# Patient Record
Sex: MALE | Race: WHITE | NOT HISPANIC OR LATINO | URBAN - METROPOLITAN AREA
[De-identification: names, ages, dates, MRNs, and addresses within clinical notes are randomized per-mention and may not be internally consistent; named-entity substitution may affect disease eponyms.]

---

## 2017-01-19 ENCOUNTER — OUTPATIENT (OUTPATIENT)
Dept: OUTPATIENT SERVICES | Facility: HOSPITAL | Age: 57
LOS: 1 days | End: 2017-01-19
Payer: COMMERCIAL

## 2017-01-19 DIAGNOSIS — K40.90 UNILATERAL INGUINAL HERNIA, WITHOUT OBSTRUCTION OR GANGRENE, NOT SPECIFIED AS RECURRENT: ICD-10-CM

## 2017-01-19 DIAGNOSIS — Z01.812 ENCOUNTER FOR PREPROCEDURAL LABORATORY EXAMINATION: ICD-10-CM

## 2017-01-19 DIAGNOSIS — Z01.818 ENCOUNTER FOR OTHER PREPROCEDURAL EXAMINATION: ICD-10-CM

## 2017-01-19 LAB
ANION GAP SERPL CALC-SCNC: 16 MMOL/L — SIGNIFICANT CHANGE UP (ref 5–17)
BUN SERPL-MCNC: 19 MG/DL — SIGNIFICANT CHANGE UP (ref 7–23)
CALCIUM SERPL-MCNC: 10 MG/DL — SIGNIFICANT CHANGE UP (ref 8.4–10.5)
CHLORIDE SERPL-SCNC: 101 MMOL/L — SIGNIFICANT CHANGE UP (ref 96–108)
CO2 SERPL-SCNC: 23 MMOL/L — SIGNIFICANT CHANGE UP (ref 22–31)
CREAT SERPL-MCNC: 0.96 MG/DL — SIGNIFICANT CHANGE UP (ref 0.5–1.3)
GLUCOSE SERPL-MCNC: 85 MG/DL — SIGNIFICANT CHANGE UP (ref 70–99)
HCT VFR BLD CALC: 47.3 % — SIGNIFICANT CHANGE UP (ref 39–50)
HGB BLD-MCNC: 16.1 G/DL — SIGNIFICANT CHANGE UP (ref 13–17)
MCHC RBC-ENTMCNC: 29.4 PG — SIGNIFICANT CHANGE UP (ref 27–34)
MCHC RBC-ENTMCNC: 34 GM/DL — SIGNIFICANT CHANGE UP (ref 32–36)
MCV RBC AUTO: 86.3 FL — SIGNIFICANT CHANGE UP (ref 80–100)
PLATELET # BLD AUTO: 295 K/UL — SIGNIFICANT CHANGE UP (ref 150–400)
POTASSIUM SERPL-MCNC: 4.8 MMOL/L — SIGNIFICANT CHANGE UP (ref 3.5–5.3)
POTASSIUM SERPL-SCNC: 4.8 MMOL/L — SIGNIFICANT CHANGE UP (ref 3.5–5.3)
RBC # BLD: 5.48 M/UL — SIGNIFICANT CHANGE UP (ref 4.2–5.8)
RBC # FLD: 12.7 % — SIGNIFICANT CHANGE UP (ref 10.3–14.5)
SODIUM SERPL-SCNC: 140 MMOL/L — SIGNIFICANT CHANGE UP (ref 135–145)
WBC # BLD: 6.55 K/UL — SIGNIFICANT CHANGE UP (ref 3.8–10.5)
WBC # FLD AUTO: 6.55 K/UL — SIGNIFICANT CHANGE UP (ref 3.8–10.5)

## 2017-01-19 PROCEDURE — G0463: CPT

## 2017-01-19 PROCEDURE — 85027 COMPLETE CBC AUTOMATED: CPT

## 2017-01-19 PROCEDURE — 80048 BASIC METABOLIC PNL TOTAL CA: CPT

## 2017-01-19 PROCEDURE — 36415 COLL VENOUS BLD VENIPUNCTURE: CPT

## 2017-01-30 ENCOUNTER — RESULT REVIEW (OUTPATIENT)
Age: 57
End: 2017-01-30

## 2017-01-31 ENCOUNTER — OUTPATIENT (OUTPATIENT)
Dept: OUTPATIENT SERVICES | Facility: HOSPITAL | Age: 57
LOS: 1 days | End: 2017-01-31
Payer: COMMERCIAL

## 2017-01-31 ENCOUNTER — APPOINTMENT (OUTPATIENT)
Dept: SURGERY | Facility: HOSPITAL | Age: 57
End: 2017-01-31

## 2017-01-31 DIAGNOSIS — K40.90 UNILATERAL INGUINAL HERNIA, WITHOUT OBSTRUCTION OR GANGRENE, NOT SPECIFIED AS RECURRENT: ICD-10-CM

## 2017-01-31 PROCEDURE — 49505 PRP I/HERN INIT REDUC >5 YR: CPT | Mod: LT

## 2017-01-31 PROCEDURE — 88304 TISSUE EXAM BY PATHOLOGIST: CPT

## 2017-01-31 PROCEDURE — 88304 TISSUE EXAM BY PATHOLOGIST: CPT | Mod: 26

## 2017-01-31 PROCEDURE — C1781: CPT

## 2017-02-06 LAB — SURGICAL PATHOLOGY STUDY: SIGNIFICANT CHANGE UP

## 2017-02-17 ENCOUNTER — APPOINTMENT (OUTPATIENT)
Dept: SURGERY | Facility: CLINIC | Age: 57
End: 2017-02-17

## 2017-02-17 VITALS
DIASTOLIC BLOOD PRESSURE: 80 MMHG | HEART RATE: 65 BPM | TEMPERATURE: 98.1 F | SYSTOLIC BLOOD PRESSURE: 143 MMHG | OXYGEN SATURATION: 98 % | RESPIRATION RATE: 16 BRPM

## 2017-02-17 DIAGNOSIS — K40.90 UNILATERAL INGUINAL HERNIA, W/OUT OBSTRUCTION OR GANGRENE, NOT SPECIFIED AS RECURRENT: ICD-10-CM

## 2017-02-17 RX ORDER — OXYCODONE AND ACETAMINOPHEN 5; 325 MG/1; MG/1
5-325 TABLET ORAL
Qty: 10 | Refills: 0 | Status: DISCONTINUED | COMMUNITY
Start: 2017-01-31 | End: 2017-02-17

## 2017-09-15 ENCOUNTER — APPOINTMENT (OUTPATIENT)
Dept: INTERNAL MEDICINE | Facility: CLINIC | Age: 57
End: 2017-09-15
Payer: COMMERCIAL

## 2017-09-15 ENCOUNTER — APPOINTMENT (OUTPATIENT)
Dept: INTERNAL MEDICINE | Facility: CLINIC | Age: 57
End: 2017-09-15

## 2017-09-15 DIAGNOSIS — Z23 ENCOUNTER FOR IMMUNIZATION: ICD-10-CM

## 2017-09-15 PROCEDURE — 90471 IMMUNIZATION ADMIN: CPT

## 2017-09-15 PROCEDURE — 90707 MMR VACCINE SC: CPT

## 2017-09-15 PROCEDURE — 99201 OFFICE OUTPATIENT NEW 10 MINUTES: CPT | Mod: 25

## 2020-04-29 ENCOUNTER — TRANSCRIPTION ENCOUNTER (OUTPATIENT)
Age: 60
End: 2020-04-29

## 2020-09-05 DIAGNOSIS — Z01.818 ENCOUNTER FOR OTHER PREPROCEDURAL EXAMINATION: ICD-10-CM

## 2020-09-06 ENCOUNTER — APPOINTMENT (OUTPATIENT)
Dept: DISASTER EMERGENCY | Facility: CLINIC | Age: 60
End: 2020-09-06

## 2020-09-06 LAB — SARS-COV-2 N GENE NPH QL NAA+PROBE: NOT DETECTED

## 2020-09-08 ENCOUNTER — TRANSCRIPTION ENCOUNTER (OUTPATIENT)
Age: 60
End: 2020-09-08

## 2020-09-09 ENCOUNTER — RESULT REVIEW (OUTPATIENT)
Age: 60
End: 2020-09-09

## 2020-09-09 ENCOUNTER — OUTPATIENT (OUTPATIENT)
Dept: OUTPATIENT SERVICES | Facility: HOSPITAL | Age: 60
LOS: 1 days | End: 2020-09-09
Payer: COMMERCIAL

## 2020-09-09 DIAGNOSIS — K57.30 DIVERTICULOSIS OF LARGE INTESTINE WITHOUT PERFORATION OR ABSCESS WITHOUT BLEEDING: ICD-10-CM

## 2020-09-09 DIAGNOSIS — K21.9 GASTRO-ESOPHAGEAL REFLUX DISEASE WITHOUT ESOPHAGITIS: ICD-10-CM

## 2020-09-09 DIAGNOSIS — Z12.89 ENCOUNTER FOR SCREENING FOR MALIGNANT NEOPLASM OF OTHER SITES: ICD-10-CM

## 2020-09-09 PROCEDURE — 88312 SPECIAL STAINS GROUP 1: CPT | Mod: 26

## 2020-09-09 PROCEDURE — 88313 SPECIAL STAINS GROUP 2: CPT | Mod: 26

## 2020-09-09 PROCEDURE — 88305 TISSUE EXAM BY PATHOLOGIST: CPT

## 2020-09-09 PROCEDURE — 88312 SPECIAL STAINS GROUP 1: CPT

## 2020-09-09 PROCEDURE — 88305 TISSUE EXAM BY PATHOLOGIST: CPT | Mod: 26

## 2020-09-09 PROCEDURE — 88313 SPECIAL STAINS GROUP 2: CPT

## 2020-09-09 PROCEDURE — 43239 EGD BIOPSY SINGLE/MULTIPLE: CPT

## 2020-09-09 PROCEDURE — 45380 COLONOSCOPY AND BIOPSY: CPT | Mod: PT

## 2020-09-10 LAB — SURGICAL PATHOLOGY STUDY: SIGNIFICANT CHANGE UP

## 2022-04-13 ENCOUNTER — EMERGENCY (EMERGENCY)
Facility: HOSPITAL | Age: 62
LOS: 1 days | Discharge: HOME | End: 2022-04-13
Attending: EMERGENCY MEDICINE | Admitting: EMERGENCY MEDICINE
Payer: COMMERCIAL

## 2022-04-13 VITALS
TEMPERATURE: 98 F | HEIGHT: 71 IN | OXYGEN SATURATION: 97 % | SYSTOLIC BLOOD PRESSURE: 187 MMHG | HEART RATE: 94 BPM | RESPIRATION RATE: 16 BRPM | WEIGHT: 230.38 LBS | DIASTOLIC BLOOD PRESSURE: 107 MMHG

## 2022-04-13 VITALS
SYSTOLIC BLOOD PRESSURE: 161 MMHG | DIASTOLIC BLOOD PRESSURE: 95 MMHG | RESPIRATION RATE: 15 BRPM | HEART RATE: 79 BPM | OXYGEN SATURATION: 95 %

## 2022-04-13 LAB
ALBUMIN SERPL ELPH-MCNC: 4 G/DL — SIGNIFICANT CHANGE UP (ref 3.3–5)
ALP SERPL-CCNC: 51 U/L — SIGNIFICANT CHANGE UP (ref 30–120)
ALT FLD-CCNC: 32 U/L DA — SIGNIFICANT CHANGE UP (ref 10–60)
ANION GAP SERPL CALC-SCNC: 7 MMOL/L — SIGNIFICANT CHANGE UP (ref 5–17)
AST SERPL-CCNC: 19 U/L — SIGNIFICANT CHANGE UP (ref 10–40)
BASOPHILS # BLD AUTO: 0.04 K/UL — SIGNIFICANT CHANGE UP (ref 0–0.2)
BASOPHILS NFR BLD AUTO: 0.8 % — SIGNIFICANT CHANGE UP (ref 0–2)
BILIRUB SERPL-MCNC: 0.5 MG/DL — SIGNIFICANT CHANGE UP (ref 0.2–1.2)
BUN SERPL-MCNC: 20 MG/DL — SIGNIFICANT CHANGE UP (ref 7–23)
CALCIUM SERPL-MCNC: 9.1 MG/DL — SIGNIFICANT CHANGE UP (ref 8.4–10.5)
CHLORIDE SERPL-SCNC: 103 MMOL/L — SIGNIFICANT CHANGE UP (ref 96–108)
CO2 SERPL-SCNC: 25 MMOL/L — SIGNIFICANT CHANGE UP (ref 22–31)
CREAT SERPL-MCNC: 0.89 MG/DL — SIGNIFICANT CHANGE UP (ref 0.5–1.3)
EGFR: 98 ML/MIN/1.73M2 — SIGNIFICANT CHANGE UP
EOSINOPHIL # BLD AUTO: 0.12 K/UL — SIGNIFICANT CHANGE UP (ref 0–0.5)
EOSINOPHIL NFR BLD AUTO: 2.5 % — SIGNIFICANT CHANGE UP (ref 0–6)
GLUCOSE SERPL-MCNC: 101 MG/DL — HIGH (ref 70–99)
HCT VFR BLD CALC: 45.7 % — SIGNIFICANT CHANGE UP (ref 39–50)
HGB BLD-MCNC: 15.9 G/DL — SIGNIFICANT CHANGE UP (ref 13–17)
IMM GRANULOCYTES NFR BLD AUTO: 0 % — SIGNIFICANT CHANGE UP (ref 0–1.5)
LYMPHOCYTES # BLD AUTO: 0.88 K/UL — LOW (ref 1–3.3)
LYMPHOCYTES # BLD AUTO: 18.4 % — SIGNIFICANT CHANGE UP (ref 13–44)
MCHC RBC-ENTMCNC: 29.8 PG — SIGNIFICANT CHANGE UP (ref 27–34)
MCHC RBC-ENTMCNC: 34.8 GM/DL — SIGNIFICANT CHANGE UP (ref 32–36)
MCV RBC AUTO: 85.7 FL — SIGNIFICANT CHANGE UP (ref 80–100)
MONOCYTES # BLD AUTO: 0.28 K/UL — SIGNIFICANT CHANGE UP (ref 0–0.9)
MONOCYTES NFR BLD AUTO: 5.9 % — SIGNIFICANT CHANGE UP (ref 2–14)
NEUTROPHILS # BLD AUTO: 3.46 K/UL — SIGNIFICANT CHANGE UP (ref 1.8–7.4)
NEUTROPHILS NFR BLD AUTO: 72.4 % — SIGNIFICANT CHANGE UP (ref 43–77)
NRBC # BLD: 0 /100 WBCS — SIGNIFICANT CHANGE UP (ref 0–0)
PLATELET # BLD AUTO: 229 K/UL — SIGNIFICANT CHANGE UP (ref 150–400)
POTASSIUM SERPL-MCNC: 4.2 MMOL/L — SIGNIFICANT CHANGE UP (ref 3.5–5.3)
POTASSIUM SERPL-SCNC: 4.2 MMOL/L — SIGNIFICANT CHANGE UP (ref 3.5–5.3)
PROT SERPL-MCNC: 7.3 G/DL — SIGNIFICANT CHANGE UP (ref 6–8.3)
RBC # BLD: 5.33 M/UL — SIGNIFICANT CHANGE UP (ref 4.2–5.8)
RBC # FLD: 12.2 % — SIGNIFICANT CHANGE UP (ref 10.3–14.5)
SODIUM SERPL-SCNC: 135 MMOL/L — SIGNIFICANT CHANGE UP (ref 135–145)
WBC # BLD: 4.78 K/UL — SIGNIFICANT CHANGE UP (ref 3.8–10.5)
WBC # FLD AUTO: 4.78 K/UL — SIGNIFICANT CHANGE UP (ref 3.8–10.5)

## 2022-04-13 PROCEDURE — 99283 EMERGENCY DEPT VISIT LOW MDM: CPT

## 2022-04-13 PROCEDURE — 80053 COMPREHEN METABOLIC PANEL: CPT

## 2022-04-13 PROCEDURE — 85025 COMPLETE CBC W/AUTO DIFF WBC: CPT

## 2022-04-13 PROCEDURE — 99284 EMERGENCY DEPT VISIT MOD MDM: CPT

## 2022-04-13 PROCEDURE — 36415 COLL VENOUS BLD VENIPUNCTURE: CPT

## 2022-04-13 PROCEDURE — 93005 ELECTROCARDIOGRAM TRACING: CPT

## 2022-04-13 PROCEDURE — 93010 ELECTROCARDIOGRAM REPORT: CPT

## 2022-04-13 NOTE — ED PROVIDER NOTE - OBJECTIVE STATEMENT
60 yo M p/w co epistaxis today. pt with hx on / off epistaxis, usually around change in season. however pt with persistent epistaxis today over past ~ 1.5 hours. No fever/chills. no fall / trauma. Pt took BP at home and states was 180 / 120. Pt is not on meds for HTN, has had elevated BP in past at home. no cp/sob/palp. no cough/ uri. no neck / back pain. no weakness/ dizziness. no fever/chills. No YEH / easy fatigue. No Headaches. no other acute co or changes. Pt vaccinated for covid, no exposures.

## 2022-04-13 NOTE — ED PROVIDER NOTE - CLINICAL SUMMARY MEDICAL DECISION MAKING FREE TEXT BOX
Epistaxis today, persistent - no anticoag. nasal packing, outpt ENT  HTN in ed - no hx. check labs, po meds

## 2022-04-13 NOTE — ED PROVIDER NOTE - NSFOLLOWUPINSTRUCTIONS_ED_ALL_ED_FT
1)  Follow-up with your Primary Medical Doctor. Call today for prompt follow-up.  2) Follow-up with ENT - call today to arrange prompt follow-up  3) Return to Emergency room for any worsening or persistent pain, shortness of breath, weakness, fever, abdominal pain, dizziness, passing out, unexplained pain in arms, legs, back, any vomiting, feeling like your heart is racing,  or any other concerning symptoms.  4) See attached instruction sheets for additional information, including information regarding signs and symptoms to look out for, reasons to seek immediate care and other important instructions.   5) Augmentin twice daily while packing in place  6) Vasotec 10mg once daily

## 2022-04-13 NOTE — ED ADULT NURSE NOTE - NSIMPLEMENTINTERV_GEN_ALL_ED
Implemented All Fall with Harm Risk Interventions:  [place] to call system. Call bell, personal items and telephone within reach. Instruct patient to call for assistance. Room bathroom lighting operational. Non-slip footwear when patient is off stretcher. Physically safe environment: no spills, clutter or unnecessary equipment. Stretcher in lowest position, wheels locked, appropriate side rails in place. Provide visual cue, wrist band, yellow gown, etc. Monitor gait and stability. Monitor for mental status changes and reorient to person, place, and time. Review medications for side effects contributing to fall risk. Reinforce activity limits and safety measures with patient and family. Provide visual clues: red socks.

## 2022-04-13 NOTE — ED PROVIDER NOTE - PATIENT PORTAL LINK FT
You can access the FollowMyHealth Patient Portal offered by [facility] by registering at the following website: [email or web address]. By joining [employer]’s FollowMyHealth portal, you will also be able to view your health information using other applications (apps) compatible with our system.

## 2022-04-13 NOTE — ED PROVIDER NOTE - CHPI ED SYMPTOMS NEG
no blurred vision/no fever/no nausea/no numbness/no vomiting/no weakness/no change in level of consciousness/no chills

## 2022-04-13 NOTE — ED PROVIDER NOTE - ENMT, MLM
Airway patent, Nasal mucosa clear. Mouth with normal mucosa. Throat has no vesicles, no oropharyngeal exudates and uvula is midline. persistent oozing R nare, no edema. Nl L nare.

## 2022-04-13 NOTE — ED ADULT NURSE NOTE - OBJECTIVE STATEMENT
62 y/o male received aox4 ambulatory c/o sudden nose bleeding today from right nostril. denies trauma/n/v/d/headache.

## 2022-04-13 NOTE — ED PROVIDER NOTE - PROGRESS NOTE DETAILS
Dw Dr Raymundo - agree with rx plan, should start Vasotec 10mg daily, will fu with pt in office. Dw Pt - feeling well, no acute co or changes. Pt with no bleeding, nl post pharynx. Dw pt re epistaxis  prec/ inst, infxn prec as well. Dw pt re HTN prec / inst and importance of close, prompt fu.  Pt will fu with PMD and with ent for further ridley and eval.

## 2022-04-13 NOTE — ED PROVIDER NOTE - CARE PROVIDER_API CALL
West Raymundo (DO)  Internal Medicine  [address] [place] [address]  Phone: [phone]  Fax: [phone]  Follow Up Time:     Franck Butler (MD)  Otolaryngology  [address], [address]  [place] [address]  Phone: [phone]  Fax: [phone]  Follow Up Time:

## 2022-04-14 ENCOUNTER — EMERGENCY (EMERGENCY)
Facility: HOSPITAL | Age: 62
LOS: 1 days | Discharge: HOME | End: 2022-04-14
Attending: INTERNAL MEDICINE | Admitting: INTERNAL MEDICINE
Payer: COMMERCIAL

## 2022-04-14 VITALS
RESPIRATION RATE: 20 BRPM | HEIGHT: 71 IN | HEART RATE: 84 BPM | TEMPERATURE: 98 F | OXYGEN SATURATION: 99 % | DIASTOLIC BLOOD PRESSURE: 112 MMHG | SYSTOLIC BLOOD PRESSURE: 156 MMHG | WEIGHT: 229.28 LBS

## 2022-04-14 VITALS
OXYGEN SATURATION: 99 % | SYSTOLIC BLOOD PRESSURE: 132 MMHG | DIASTOLIC BLOOD PRESSURE: 95 MMHG | RESPIRATION RATE: 20 BRPM | TEMPERATURE: 98 F | HEART RATE: 84 BPM

## 2022-04-14 PROCEDURE — 99283 EMERGENCY DEPT VISIT LOW MDM: CPT

## 2022-04-14 PROCEDURE — 99284 EMERGENCY DEPT VISIT MOD MDM: CPT

## 2022-04-14 RX ADMIN — Medication 0.2 MILLIGRAM(S): at 21:52

## 2022-04-14 NOTE — ED ADULT NURSE NOTE - OBJECTIVE STATEMENT
60 yo M p/w co epistaxis today. pt with hx on / off epistaxis, usually around change in season. however pt with persistent epistaxis today over past ~ 1.5 hours. No fever/chills. no fall / trauma. Pt took BP at home and states was 180 / 120. Pt is not on meds for HTN, has had elevated BP in past at home. no cp/sob/palp. no cough/ uri. no neck / back pain. no weakness/ dizziness. no fever/chills. No YEH / easy fatigue. No Headaches. no other acute co or changes. Pt vaccinated for covid, no exposures.  ·

## 2022-04-14 NOTE — ED PROVIDER NOTE - PATIENT PORTAL LINK FT
You can access the FollowMyHealth Patient Portal offered by [facility] by registering at the following website: [email or web address]. By joining [email or web address]’s FollowMyHealth portal, you will also be able to view your health information using other applications (apps) compatible with our system.

## 2022-04-14 NOTE — ED PROVIDER NOTE - OBJECTIVE STATEMENT
60 yo M p/w co epistaxis today. pt with hx on / off epistaxis, usually around change in season. however pt with persistent epistaxis today over past ~ 1.5 hours. No fever/chills. no fall / trauma. Pt took BP at home and states was 180 / 120. Pt is not on meds for HTN, has had elevated BP in past at home. no cp/sob/palp. no cough/ uri. no neck / back pain. no weakness/ dizziness. no fever/chills. No YEH / easy fatigue. No Headaches. no other acute co or changes. Pt vaccinated for covid, no exposures.  · Negative Findings: no blurred vision, no change in level of consciousness, no chills, no fever, no nausea, no numbness, no vomiting, no weakness 62 y/o male who was treated for HTN and  epistaxis yesterday. His BP was  180 / 120. He received a rhino plug that effectively controlled the bleeding and was started on Vasotec for the BP. the following day his BP was elevated again but presumed to be due to the discomfort from the plug. Dr Raymundo told him to double the Vasotec, he then came to the ED requesting removal of the plug, there was no active bleeding, his bp was treated with catapres .2mg and improved the BP, the plug was removed, no bleeding and the patient was notably comfortably

## 2022-04-14 NOTE — ED PROVIDER NOTE - CARE PROVIDER_API CALL
West Raymundo (DO)  Internal Medicine  [address] [place] [address]  Phone: [phone]  Fax: [phone]  Follow Up Time: 1-3 Days

## 2022-04-14 NOTE — ED ADULT NURSE NOTE - CHPI ED NUR RELIEVING FX
Referral has been faxed to [facility] heart and vascular for review of patient's marfan's Syndrome    none

## 2022-04-14 NOTE — ED ADULT NURSE NOTE - NSFALLRSKOUTCOME_ED_ALL_ED
Patient informed of lab results, creatinine better then previous lab of 2.9. 2.5 yesterday. Patient verbalized understanding. Lizzie Cameron LPN      ----- Message from ERIC Jordan sent at 4/20/2020  9:01 PM EDT -----  Regarding: FW:  Creatinine is down from 2.9 to 2.5  ----- Message -----  From: Lab, Background User  Sent: 4/20/2020   6:32 PM EDT  To: ERIC Jordan      
Universal Safety Interventions

## 2022-04-14 NOTE — ED PROVIDER NOTE - CLINICAL SUMMARY MEDICAL DECISION MAKING FREE TEXT BOX
elevated BP treated with catapres, removal of plug , no bleeding and the patient was notably comfortable  Discharged in stable condition with ENT F/U and Dr Raymundo

## 2022-04-14 NOTE — ED ADULT NURSE NOTE - NSICDXPASTMEDICALHX_GEN_ALL_CORE_FT
PAST MEDICAL HISTORY:  H/O epistaxis     Hypertension      No signs of meconium exposure, Normal patterns of skin texture, integrity, pigmentation, color, vascularity, and perfusion; No rashes or eruptions.

## 2022-04-18 ENCOUNTER — APPOINTMENT (OUTPATIENT)
Dept: OTOLARYNGOLOGY | Facility: CLINIC | Age: 62
End: 2022-04-18
Payer: COMMERCIAL

## 2022-04-18 VITALS
HEART RATE: 92 BPM | BODY MASS INDEX: 31.5 KG/M2 | SYSTOLIC BLOOD PRESSURE: 148 MMHG | WEIGHT: 225 LBS | HEIGHT: 71 IN | DIASTOLIC BLOOD PRESSURE: 90 MMHG

## 2022-04-18 DIAGNOSIS — I10 ESSENTIAL (PRIMARY) HYPERTENSION: ICD-10-CM

## 2022-04-18 DIAGNOSIS — H61.23 IMPACTED CERUMEN, BILATERAL: ICD-10-CM

## 2022-04-18 DIAGNOSIS — Z86.79 PERSONAL HISTORY OF OTHER DISEASES OF THE CIRCULATORY SYSTEM: ICD-10-CM

## 2022-04-18 PROBLEM — Z87.898 PERSONAL HISTORY OF OTHER SPECIFIED CONDITIONS: Chronic | Status: ACTIVE | Noted: 2022-04-14

## 2022-04-18 PROCEDURE — 69210 REMOVE IMPACTED EAR WAX UNI: CPT

## 2022-04-18 PROCEDURE — 99243 OFF/OP CNSLTJ NEW/EST LOW 30: CPT | Mod: 25

## 2022-04-18 PROCEDURE — 30901 CONTROL OF NOSEBLEED: CPT | Mod: RT

## 2022-04-18 RX ORDER — ENALAPRIL MALEATE 5 MG/1
TABLET ORAL
Refills: 0 | Status: ACTIVE | COMMUNITY

## 2022-04-18 RX ORDER — MUPIROCIN 20 MG/G
2 OINTMENT TOPICAL 3 TIMES DAILY
Qty: 1 | Refills: 3 | Status: ACTIVE | COMMUNITY
Start: 2022-04-18 | End: 1900-01-01

## 2022-04-18 NOTE — REASON FOR VISIT
[Initial Consultation] : an initial consultation for [FreeTextEntry2] : Nose bleeds/Nose packed 4/13

## 2022-04-18 NOTE — ASSESSMENT
[FreeTextEntry1] : Reviewed and reconciled medications, allergies, PMHx, PSHx, SocHx, FMHx. \par \par Plan:\par cerumen removed. Right Nasal Cauterization. if starts to bleed again put pressure on the nose and hold for 5 mins. sneeze with mouth open. no nose blowing. mupirocin prescribed. no saline spray since blood pressure is too high. FU 10-14 days

## 2022-04-18 NOTE — HISTORY OF PRESENT ILLNESS
[de-identified] : Patient presents stating that he gets nose bleeds seasonally. The last nose bleed was Wednesday on the right side when he was outside walking is dog. He states this one lastest longer than normal and he was not able to stop the bleeding. Patient states his BP was 187/70 that day, he went to [facility] where they put in a rhino rocket. Patient went back to [facility] a couple days later due to the pressure where they took the rocket out and the bleeding stopped. Patient states BP came down once the rhino rocket came out.

## 2022-04-18 NOTE — ADDENDUM
[FreeTextEntry1] : Documented by Sarah Lee acting as scribe for Dr. Butler on 04/18/2022. \par \par All Medical record entries made by the scribe were at my. Dr. Butler direction and personally dictated by me on 04/18/2022. I have reviewed the chart and agree that the record accurately reflects my personal performance of the history, physical exam, assessment and plan. I have also personally directed, reviewed, and agreed with the discharge instructions.

## 2022-04-18 NOTE — PROCEDURE
[FreeTextEntry2] : epistaxis  [FreeTextEntry1] : Right Nasal Cauterization [FreeTextEntry3] : Procedure: Right Nasal Cauterization. After topical 4% xylocaine and oxymetazoline, the nasal vault was re-evaluated. Bleeding site identified. Cauterized with silver nitrate. Post-procedure instructions given. Patient tolerated procedure well

## 2022-04-18 NOTE — PHYSICAL EXAM
[Hearing Scales Test (Tuning Fork On Forehead)] : no lateralization of tone [Midline] : trachea located in midline position [Normal] : no rashes [FreeTextEntry8] : cerumen impaction removed with curettage [FreeTextEntry9] : cerumen impaction removed with curettage [FreeTextEntry1] : deviated septum left and dryness\par right side hypervascular with small hemangioma \par deviated septum right anteriorly  [de-identified] : elongated uvula [de-identified] : class 1 [de-identified] : CLARISSE

## 2022-04-18 NOTE — CONSULT LETTER
[Dear  ___] : Dear  [unfilled], [Consult Letter:] : I had the pleasure of evaluating your patient, [unfilled]. [Please see my note below.] : Please see my note below. [Consult Closing:] : Thank you very much for allowing me to participate in the care of this patient.  If you have any questions, please do not hesitate to contact me. [Sincerely,] : Sincerely, [FreeTextEntry3] : Fracnk Butler MD FACS

## 2022-05-02 ENCOUNTER — APPOINTMENT (OUTPATIENT)
Dept: OTOLARYNGOLOGY | Facility: CLINIC | Age: 62
End: 2022-05-02
Payer: COMMERCIAL

## 2022-05-02 VITALS
HEART RATE: 88 BPM | BODY MASS INDEX: 31.5 KG/M2 | SYSTOLIC BLOOD PRESSURE: 123 MMHG | HEIGHT: 71 IN | DIASTOLIC BLOOD PRESSURE: 87 MMHG | WEIGHT: 225 LBS

## 2022-05-02 DIAGNOSIS — Z82.41 FAMILY HISTORY OF SUDDEN CARDIAC DEATH: ICD-10-CM

## 2022-05-02 DIAGNOSIS — R04.0 EPISTAXIS: ICD-10-CM

## 2022-05-02 PROCEDURE — 30901 CONTROL OF NOSEBLEED: CPT

## 2022-05-02 PROCEDURE — 99213 OFFICE O/P EST LOW 20 MIN: CPT | Mod: 25

## 2022-05-02 NOTE — ASSESSMENT
[FreeTextEntry1] : Reviewed and reconciled medications, allergies, PMHx, PSHx, SocHx, FMHx. \par \par h/o recurrent episodes of nosebleeds. Small hemangioma right side which was cauterized 4/18/22 in office\par \par Plan:\par No nose blowing. Right Nasal Cauterization. use saline gel spray 2-3 times a day. use mupirocin. FU 2-3 weeks

## 2022-05-02 NOTE — PHYSICAL EXAM
[FreeTextEntry1] : Some scabbing on the right side - no bleeding\par Left side clear\par bleeding site is anterior superior  [Midline] : trachea located in midline position [Normal] : orientation to person, place, and time: normal

## 2022-05-02 NOTE — HISTORY OF PRESENT ILLNESS
[de-identified] : The patient presents with h/o recurrent episodes of nosebleeds. Small hemangioma right side which was cauterized 4/18/22 in office. Pt reports having 2 nosebleed episodes a few days ago when in the shower. Pt reports using mupirocin.

## 2022-05-02 NOTE — ADDENDUM
[FreeTextEntry1] : Documented by Dafne Sharma acting as scribe for Dr. Butler on 05/02/2022. \par \par All Medical record entries made by the scribe were at my. Dr. Butler direction and personally dictated by me on 05/02/2022. I have reviewed the chart and agree that the record accurately reflects my personal performance of the history, physical exam, assessment and plan. I have also personally directed, reviewed, and agreed with the discharge instructions.

## 2022-05-02 NOTE — CONSULT LETTER
[Dear  ___] : Dear  [unfilled], [Courtesy Letter:] : I had the pleasure of seeing your patient, [unfilled], in my office today. [Please see my note below.] : Please see my note below. [Consult Closing:] : Thank you very much for allowing me to participate in the care of this patient.  If you have any questions, please do not hesitate to contact me. [Sincerely,] : Sincerely, [FreeTextEntry3] : Franck Butler MD FACS

## 2022-05-23 ENCOUNTER — APPOINTMENT (OUTPATIENT)
Dept: OTOLARYNGOLOGY | Facility: CLINIC | Age: 62
End: 2022-05-23
Payer: COMMERCIAL

## 2022-05-23 VITALS
SYSTOLIC BLOOD PRESSURE: 128 MMHG | DIASTOLIC BLOOD PRESSURE: 85 MMHG | BODY MASS INDEX: 30.8 KG/M2 | WEIGHT: 220 LBS | HEIGHT: 71 IN | HEART RATE: 71 BPM

## 2022-05-23 DIAGNOSIS — J34.2 DEVIATED NASAL SEPTUM: ICD-10-CM

## 2022-05-23 DIAGNOSIS — R11.10 VOMITING, UNSPECIFIED: ICD-10-CM

## 2022-05-23 DIAGNOSIS — H60.63 UNSPECIFIED CHRONIC OTITIS EXTERNA, BILATERAL: ICD-10-CM

## 2022-05-23 PROCEDURE — 31575 DIAGNOSTIC LARYNGOSCOPY: CPT

## 2022-05-23 PROCEDURE — 99213 OFFICE O/P EST LOW 20 MIN: CPT | Mod: 25

## 2022-05-23 NOTE — PHYSICAL EXAM
[] : septum deviated to the right [Normal] : orientation to person, place, and time: normal [FreeTextEntry8] : hard, dry cerumen removed via curettage  [FreeTextEntry9] : hard, dry cerumen removed via curettage  [FreeTextEntry1] : strand of saliva on elongated uvula\par gags easily\par tonsils class 2 [FreeTextEntry2] : sinuses nontender to percussion.

## 2022-05-23 NOTE — ASSESSMENT
[FreeTextEntry1] : Reviewed and reconciled medications, allergies, PMHx, PSHx, SocHx, FMHx.\par \par h/o recurrent episodes of nosebleeds, Small hemangioma right side which was cauterized 4/18/22 in office, GERD\par c/o dysphasia, nausea after eating certain foods.\par \par Flexible laryngoscopy \par narrowing at level of soft palate\par Edema of postcricoid, arytenoids and interarytenoids - no pooling, lesions or growths\par \par Plan:\par cerumen removed. Flexible laryngoscopy. FEEST ordered. possible MBS, possible esophagram. continue to use saline gel spray 2-3 times a day. FU after FEEST.

## 2022-05-23 NOTE — HISTORY OF PRESENT ILLNESS
[de-identified] : Pt presents with h/o recurrent episodes of nosebleeds, GERD, Small hemangioma right side which was cauterized 4/18/22 in office. Pt denies further nosebleeds. Pt c/o constant throat clearing and feeling nauseous after eating certain food, especially  after eating eggs, undercooked bagel, melted butter. Pt reports using saline gel spray.

## 2022-05-23 NOTE — ADDENDUM
[FreeTextEntry1] : Documented by Dafne Sharma acting as scribe for Dr. Butler on 05/23/2022.\par \par All Medical record entries made by the scribe were at my, Dr. Butler,direction and personally dictated by me on 05/23/2022. I have reviewed the chart and agree that the record accurately reflects my personal performance of the history, physical exam, assessment and plan. I have also personally directed, reviewed, and agreed with the discharge instructions.

## 2022-05-23 NOTE — PROCEDURE
[Gag Reflex] : gag reflex preventing mirror examination [Dysphagia] : dysphagia not clearly evaluated by indirect laryngoscopy [None] : none [Flexible Endoscope] : examined with the flexible endoscope [de-identified] : Procedure: Flexible Fiberoptic Laryngoscopy: Risks, benefits, and alternatives of flexible laryngoscopy were explained to the patient. The patient gave oral consent to proceed. The flexible scope was inserted into the left nasal cavity and advanced towards the nasopharynx. Visualized mucosa over the turbinates and septum were as described as above. The nasopharynx was clear. Oropharyngeal walls were symmetric and mobile without lesion, mass, or edema. Hypopharynx was also without lesion. Larynx was mobile without lesions.Edema of postcricoid, arytenoids and interarytenoids - no pooling, lesions or growths True vocal folds were white without mass or lesion. Base of tongue was within normal limits. narrowing at level of soft palate\par \par

## 2022-06-14 ENCOUNTER — APPOINTMENT (OUTPATIENT)
Dept: OTOLARYNGOLOGY | Facility: CLINIC | Age: 62
End: 2022-06-14
Payer: COMMERCIAL

## 2022-06-14 PROCEDURE — 92610 EVALUATE SWALLOWING FUNCTION: CPT | Mod: GN

## 2022-06-24 ENCOUNTER — APPOINTMENT (OUTPATIENT)
Dept: OTOLARYNGOLOGY | Facility: CLINIC | Age: 62
End: 2022-06-24
Payer: COMMERCIAL

## 2022-06-24 VITALS
SYSTOLIC BLOOD PRESSURE: 135 MMHG | WEIGHT: 225 LBS | DIASTOLIC BLOOD PRESSURE: 87 MMHG | HEIGHT: 72 IN | BODY MASS INDEX: 30.48 KG/M2 | HEART RATE: 80 BPM

## 2022-06-24 DIAGNOSIS — K44.9 DIAPHRAGMATIC HERNIA W/OUT OBSTRUCTION OR GANGRENE: ICD-10-CM

## 2022-06-24 DIAGNOSIS — K21.9 GASTRO-ESOPHAGEAL REFLUX DISEASE W/OUT ESOPHAGITIS: ICD-10-CM

## 2022-06-24 DIAGNOSIS — R13.12 DYSPHAGIA, OROPHARYNGEAL PHASE: ICD-10-CM

## 2022-06-24 DIAGNOSIS — J31.0 CHRONIC RHINITIS: ICD-10-CM

## 2022-06-24 PROCEDURE — 99214 OFFICE O/P EST MOD 30 MIN: CPT

## 2022-06-24 RX ORDER — FAMOTIDINE 20 MG/1
20 TABLET, FILM COATED ORAL
Qty: 90 | Refills: 3 | Status: ACTIVE | COMMUNITY
Start: 2022-06-24 | End: 1900-01-01

## 2022-06-24 NOTE — HISTORY OF PRESENT ILLNESS
[de-identified] : The patient presents with h/o recurrent episodes of nosebleeds, GERD, Small hemangioma right side which was cauterized 4/18/22 in office. The patient presents today for FEEST results.

## 2022-06-24 NOTE — ADDENDUM
[FreeTextEntry1] : Documented by Sarah Lee acting as scribe for Dr. Butler on 06/24/2022.   All Medical record entries made by the scribe were at my. Dr. Butler direction and personally dictated by me on 06/24/2022. I have reviewed the chart and agree that the record accurately reflects my personal performance of the history, physical exam, assessment and plan. I have also personally directed, reviewed, and agreed with the discharge instructions.

## 2022-06-24 NOTE — ASSESSMENT
[FreeTextEntry1] : Reviewed and reconciled medications, allergies, PMHx, PSHx, SocHx, FMHx. \par \par h/o recurrent episodes of nosebleeds, GERD, Small hemangioma right side which was cauterized 4/18/22 in office. \par The patient presents today for FEEST results. \par \par FEEST 06/14/2022-\par showed Laryngeal penetration or aspiration: Active laryngopharyngeal reflux marked by return of green-tinged material increasing from trace to mild amounts along the postcricoid and bilateral pyriforms with succession of PO trials\par \par hiatal hernia 5 cm 2020\par \par Plan:\par FEEST results discussed. esophagram ordered. omeprazole prescribed - take 30 mins prior to breakfast. famotidine prescribed - take at night. more then 50% time spent consulting. FU after xray \par

## 2022-07-06 ENCOUNTER — OUTPATIENT (OUTPATIENT)
Dept: OUTPATIENT SERVICES | Facility: HOSPITAL | Age: 62
LOS: 1 days | End: 2022-07-06
Payer: COMMERCIAL

## 2022-07-06 DIAGNOSIS — K21.9 GASTRO-ESOPHAGEAL REFLUX DISEASE WITHOUT ESOPHAGITIS: ICD-10-CM

## 2022-07-06 DIAGNOSIS — R13.12 DYSPHAGIA, OROPHARYNGEAL PHASE: ICD-10-CM

## 2022-07-06 PROCEDURE — 74220 X-RAY XM ESOPHAGUS 1CNTRST: CPT

## 2022-07-06 PROCEDURE — 74220 X-RAY XM ESOPHAGUS 1CNTRST: CPT | Mod: 26

## 2022-07-06 RX ORDER — OMEPRAZOLE 40 MG/1
40 CAPSULE, DELAYED RELEASE ORAL TWICE DAILY
Qty: 90 | Refills: 3 | Status: ACTIVE | COMMUNITY
Start: 1900-01-01 | End: 1900-01-01

## 2023-01-04 NOTE — ED ADULT NURSE NOTE - NSFALLRSKASSESASSIST_ED_ALL_ED
"  Occupational Therapy Treatment Note     Date: 1/4/2023  Name: Nahum Alfaro  [facility] Number: [id number]    Therapy Diagnosis:   Encounter Diagnoses   Name Primary?    Decreased range of motion of both wrists Yes    Intrinsic muscle tightness      Physician: Barb Llamas PA-C    Physician Orders: OT eval and tx  Medical Diagnosis: Bilateral stiffness of wrist joints [M25.631, M25.632]  Evaluation Date: 12/21/2022  Insurance Authorization Period Expiration: 12/31/2023  Plan of Care Certification Period: 12/21/2022 to 3/21/2023     Visit # / Visits authorized: 1/20  FOTO: 1/3     Surgical Procedure: none  Date of Return to MD: 12/23/2022 - rheumatology; 1/20/2023 - orthopedics     Precautions:  Standard    Time In: 1545  Time Out: 1630  Total Billable Time: 45 minutes    Subjective     Pt reports: "I've been doing my exercises, but the finger bending one is the only one that hurts."    he was compliant with home exercise program given on evaluation.  Response to previous treatment: good   Functional change: decreased pain    Pain: 0/10 (1/10 on evaluation)  Location: bilateral wrists (7-8/10 at worst)    Objective   Objective measures updated at progress report unless specified.    Treatment     Nahum received the following direct contact modalities after being cleared for contraindications for 0 minutes:  - NT    Nahum received the following manual therapy techniques for bilateral upper extremities for 15 minutes:   - forearm and wrist mobs  - mobs to each jt of each digit, bilateral hands  - soft tissue mobilization to bilateral hand intrinsics to decrease stiffness/tightness    Nahum received therapeutic exercises for bilateral upper extremities for 30 minutes including:  - passive stretching to bilateral wrists into extension over towel rolls with simultaneous moist heat packs at start of session  - passive stretching of bilateral wrists into extension over bolster x 30 sec each, 3 rounds  - bilateral wrist " "extension "prayer" stretch with min A x 1 min hold, 2 rounds  - bilateral forearm rotations with 1# pronator wand to facilitate stretch at end ranges x 20 reps each  - passive stretching of each digit into hook flexion as tolerated  - bilateral wrist extension stretch against wall x 1 min hold  - bilateral digit extension stretch against countertop x 1 min hold  - bilateral hook flexion stretch against countertop x 1 min hold  - composite  strength with Progressive Hand Exerciser (4th position) 5x10 each    Home Exercises and Education Provided     Education provided:   - reviewed home exercise program issued at evaluation    Written Home Exercises Provided: Patient instructed to cont prior HEP  Exercises were reviewed and Nhaum was able to demonstrate them prior to the end of the session. Nahum demonstrated good understanding of the HEP provided.     See EMR under Patient Instructions for exercises provided during prior visit.        Assessment     Nahum was seen for his first tx session on this date since his initial evaluation on 12/22/2022. He has been mostly compliant with his home exercise program and tolerated tx fairly. He continues to be limited by significant joint stiffness and is unable to relax/release muscle tension during manual techniques.    Nahum is progressing towards his goals and there are no updates to goals at this time. Pt prognosis is Fair.     Pt will continue to benefit from skilled outpatient occupational therapy to address the deficits listed in the problem list on initial evaluation provide pt/family education and to maximize pt's level of independence in the home and community environment.     Pt's spiritual, cultural and educational needs considered and pt agreeable to plan of care and goals.    Anticipated barriers to occupational therapy: chronic symptoms (significant muscle tension and joint stiffness)    Goals:  Short Term Goals: (4 weeks)  1. Pt will be independent with " HEP in 2 visits.  2. Pt will report decreased bilateral wrist pain to a 5-6/10 with stretching and extension.  3. Pt will increase bilateral forearm supination AROM by 10 degrees to enable dressing, grooming activities.  4. Pt will increase bilateral wrist extension AROM by 10 degrees to enable dressing, grooming activities.  5. Pt will exhibit decreased bilateral hand intrinsic tightness.      Long Term Goals: (8 weeks)  1. Pt will report decreased bilateral wrist and hand pain to 3-4/10 with stretching, extension, and weightbearing.  2. Pt will exhibit 65-75 degrees of B supination/pronation to enable independence with self-care and meal preparation.  3. Pt will exhibit 65-75 degrees of B wrist flexion/extension to enable independence with self-care and meal preparation.  4. Pt will make full bilateral hook fists to enable grasping and squeezing objects for self-care.  5. Pt will increase B  strength by 5-10# to allow a firm grasp on cooking utensils, steering wheel, etc.  6. Pt will report a decrease in FOTO limitation score of < 28%, which would indicate an improvement in quality of life.    Plan     Plan of Care Certification: 12/21/2022 to 3/21/2023      Outpatient Occupational Therapy 2 times weekly for 8 weeks to include the following interventions: Manual Therapy, Moist Heat/ Ice, Patient Education, Self Care, Therapeutic Activities, Therapeutic Exercise, and Ultrasound      YOEL CARRERA OT      no

## 2024-12-11 ENCOUNTER — NON-APPOINTMENT (OUTPATIENT)
Age: 64
End: 2024-12-11

## 2024-12-13 ENCOUNTER — APPOINTMENT (OUTPATIENT)
Dept: OTOLARYNGOLOGY | Facility: CLINIC | Age: 64
End: 2024-12-13
Payer: COMMERCIAL

## 2024-12-13 ENCOUNTER — NON-APPOINTMENT (OUTPATIENT)
Age: 64
End: 2024-12-13

## 2024-12-13 VITALS
WEIGHT: 200 LBS | HEIGHT: 72 IN | SYSTOLIC BLOOD PRESSURE: 144 MMHG | DIASTOLIC BLOOD PRESSURE: 88 MMHG | HEART RATE: 70 BPM | BODY MASS INDEX: 27.09 KG/M2

## 2024-12-13 DIAGNOSIS — R04.0 EPISTAXIS: ICD-10-CM

## 2024-12-13 DIAGNOSIS — J34.2 DEVIATED NASAL SEPTUM: ICD-10-CM

## 2024-12-13 DIAGNOSIS — H61.23 IMPACTED CERUMEN, BILATERAL: ICD-10-CM

## 2024-12-13 DIAGNOSIS — H60.63 UNSPECIFIED CHRONIC OTITIS EXTERNA, BILATERAL: ICD-10-CM

## 2024-12-13 DIAGNOSIS — J31.0 CHRONIC RHINITIS: ICD-10-CM

## 2024-12-13 PROCEDURE — G0268 REMOVAL OF IMPACTED WAX MD: CPT

## 2024-12-13 PROCEDURE — 99213 OFFICE O/P EST LOW 20 MIN: CPT | Mod: 25

## 2024-12-13 RX ORDER — MUPIROCIN 20 MG/G
2 OINTMENT TOPICAL 3 TIMES DAILY
Qty: 1 | Refills: 3 | Status: ACTIVE | COMMUNITY
Start: 2024-12-13 | End: 1900-01-01

## 2024-12-13 RX ORDER — MECLIZINE HYDROCHLORIDE 25 MG/1
TABLET ORAL
Refills: 0 | Status: ACTIVE | COMMUNITY

## 2025-06-16 ENCOUNTER — APPOINTMENT (OUTPATIENT)
Dept: OTOLARYNGOLOGY | Facility: CLINIC | Age: 65
End: 2025-06-16
Payer: COMMERCIAL

## 2025-06-16 ENCOUNTER — NON-APPOINTMENT (OUTPATIENT)
Age: 65
End: 2025-06-16

## 2025-06-16 VITALS
SYSTOLIC BLOOD PRESSURE: 147 MMHG | HEART RATE: 79 BPM | BODY MASS INDEX: 27.22 KG/M2 | HEIGHT: 72 IN | DIASTOLIC BLOOD PRESSURE: 94 MMHG | WEIGHT: 201 LBS

## 2025-06-16 PROCEDURE — 31231 NASAL ENDOSCOPY DX: CPT

## 2025-06-16 PROCEDURE — 99213 OFFICE O/P EST LOW 20 MIN: CPT | Mod: 25

## 2025-06-16 RX ORDER — AZELASTINE 137 UG/1
137 SPRAY, METERED NASAL TWICE DAILY
Qty: 3 | Refills: 3 | Status: ACTIVE | COMMUNITY
Start: 2025-06-16 | End: 1900-01-01

## 2025-06-16 RX ORDER — METHYLPREDNISOLONE 4 MG/1
4 TABLET ORAL
Qty: 1 | Refills: 0 | Status: ACTIVE | COMMUNITY
Start: 2025-06-16 | End: 1900-01-01